# Patient Record
Sex: FEMALE | Race: WHITE
[De-identification: names, ages, dates, MRNs, and addresses within clinical notes are randomized per-mention and may not be internally consistent; named-entity substitution may affect disease eponyms.]

---
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
## 2020-03-15 NOTE — EDM.PDOC
ED HPI GENERAL MEDICAL PROBLEM





- General


Chief Complaint: Respiratory Problem


Stated Complaint: SOB ASTHMA


Time Seen by Provider: 03/15/20 21:15


Source of Information: Reports: Patient


History Limitations: Reports: No Limitations





- History of Present Illness


INITIAL COMMENTS - FREE TEXT/NARRATIVE: 





20-year-old female with a long history of asthma, patient has an inhaler and 

nebulizer but has not been using it because her father told her that with the 

coronavirus, if she feels she needs to use her medicine she should be seen 

instead.  She has had no fever or chills.  No exposure to smoke or animals or 

any unusual environmental problems.  She looks perfectly comfortable.


Onset: Unknown/Unsure


Duration: Day(s): (3 days of increased shortness of breath)


Associated Symptoms: Reports: Cough.  Denies: Fever/Chills





- Related Data


 Allergies











Allergy/AdvReac Type Severity Reaction Status Date / Time


 


Sulfa (Sulfonamide Allergy  Rash Verified 03/15/20 21:05





Antibiotics)     











Home Meds: 


 Home Meds





Albuterol Sulfate [Albuterol Sulfate Hfa] 2 puff IN Q4H PRN 11/02/19 [History]


Etonogestrel [Nexplanon] 1 unit IMPLANT ASDIRECTED 11/02/19 [History]


Albuterol [Proventil Neb Soln] 1 ampule NEB Q4H PRN 03/15/20 [History]


hydrOXYzine pamoate [Hydroxyzine Pamoate] 25 mg PO QID PRN 03/15/20 [History]











Past Medical History


HEENT History: Reports: Impaired Vision


Cardiovascular History: Reports: Arrhythmia, Heart Murmur, Other (See Below)


Other Cardiovascular History: pulmonary artiesia, will need a pulmonary valve 

in the future


Respiratory History: Reports: Asthma, Pneumonia, Recurrent


Musculoskeletal History: Reports: Fracture, Other (See Below)


Other Musculoskeletal History: toe and knee corrections


Neurological History: Reports: Migraines


Psychiatric History: Reports: Anxiety, Depression, Suicide Attempt, Suicidal 

Ideation


Endocrine/Metabolic History: Reports: Obesity/BMI 30+





- Past Surgical History


Cardiovascular Surgical History: Reports: Other (See Below)


Other Cardiovascular Surgeries/Procedures: open heart repair


Musculoskeletal Surgical History: Reports: Other (See Below)


Other Musculoskeletal Surgeries/Procedures:: toe and knee corrections





Social & Family History





- Tobacco Use


Smoking Status *Q: Never Smoker





ED ROS GENERAL





- Review of Systems


Review Of Systems: See Below (Mild cough)


Constitutional: Denies: Fever, Chills


Respiratory: Reports: Shortness of Breath, Wheezing, Cough


Cardiovascular: Denies: Chest Pain


GI/Abdominal: Denies: Nausea, Vomiting


Skin: Reports: No Symptoms





ED EXAM, GENERAL





- Physical Exam


Exam: See Below


Exam Limited By: No Limitations


General Appearance: Alert, No Apparent Distress


Throat/Mouth: Normal Inspection


Head: Atraumatic


Respiratory/Chest: No Respiratory Distress, Lungs Clear, Other (Even with 

forced expiration I hear minimal if any wheezing)


Neurological: Alert, Oriented





Course





- Vital Signs


Last Recorded V/S: 


 Last Vital Signs











Temp  98.0 F   03/15/20 21:11


 


Pulse  69   03/15/20 21:11


 


Resp  18   03/15/20 21:11


 


BP  99/64   03/15/20 21:11


 


Pulse Ox  96   03/15/20 21:11














- Orders/Labs/Meds


Orders: 


 Active Orders 24 hr











 Category Date Time Status


 


 RT Aerosol Therapy [RC] ASDIRECTED Care  03/15/20 21:34 Active











Meds: 


Medications














Discontinued Medications














Generic Name Dose Route Start Last Admin





  Trade Name Tayler  PRN Reason Stop Dose Admin


 


Albuterol/Ipratropium  3 ml  03/15/20 21:33  03/15/20 21:38





  Duoneb 3.0-0.5 Mg/3 Ml  NEB  03/15/20 21:34  3 ml





  ONETIME ONE   Administration





     





     





     





     














- Re-Assessments/Exams


Free Text/Narrative Re-Assessment/Exam: 





03/15/20 21:37


Patient was given a DuoNeb and encouraged to use her nebulizer and inhaler as 

needed over the next several days.  She was also given an instymed for 

additional albuterol ampules.  Return anytime if worsening despite treatment 

such as high fever or increased shortness of breath.





Departure





- Departure


Time of Disposition: 21:52


Disposition: Home, Self-Care 01


Clinical Impression: 


Asthma with acute exacerbation


Qualifiers:


 Asthma severity: mild Asthma persistence: intermittent Qualified Code(s): 

J45.21 - Mild intermittent asthma with (acute) exacerbation








- Discharge Information


Instructions:  Asthma, Adult


Referrals: 


Guera Cristina CNM [Primary Care Provider] - 


Forms:  ED Department Discharge


Care Plan Goals: 


Continue activity as tolerated, use your nebulizer and inhaler as directed for 

shortness of breath or wheezing.  Return or recheck if worsening such as fever 

or increased shortness of breath despite treatment.





Sepsis Event Note





- Evaluation


Sepsis Screening Result: No Definite Risk





- Focused Exam


Vital Signs: 


 Vital Signs











  Temp Pulse Resp BP Pulse Ox


 


 03/15/20 21:11  98.0 F  69  18  99/64  96











Date Exam was Performed: 03/16/20


Time Exam was Performed: 02:12





- My Orders


Last 24 Hours: 


My Active Orders





03/15/20 21:34


RT Aerosol Therapy [RC] ASDIRECTED 














- Assessment/Plan


Last 24 Hours: 


My Active Orders





03/15/20 21:34


RT Aerosol Therapy [RC] ASDIRECTED